# Patient Record
Sex: MALE | Race: WHITE
[De-identification: names, ages, dates, MRNs, and addresses within clinical notes are randomized per-mention and may not be internally consistent; named-entity substitution may affect disease eponyms.]

---

## 2018-03-16 ENCOUNTER — HOSPITAL ENCOUNTER (EMERGENCY)
Dept: HOSPITAL 39 - ER | Age: 73
Discharge: HOME | End: 2018-03-16
Payer: MEDICARE

## 2018-03-16 VITALS — SYSTOLIC BLOOD PRESSURE: 176 MMHG | DIASTOLIC BLOOD PRESSURE: 93 MMHG

## 2018-03-16 VITALS — TEMPERATURE: 99.1 F

## 2018-03-16 VITALS — OXYGEN SATURATION: 97 %

## 2018-03-16 DIAGNOSIS — M70.51: Primary | ICD-10-CM

## 2018-03-16 DIAGNOSIS — F80.9: ICD-10-CM

## 2018-03-16 DIAGNOSIS — I10: ICD-10-CM
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
## 2018-03-16 DIAGNOSIS — L57.8: ICD-10-CM
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
## 2018-03-16 NOTE — ED.PDOC
History of Present Illness





- General


Chief Complaint: Lower Extremity Injury


Stated Complaint: RIGHT KNEE PAIN


Time Seen by Provider: 03/16/18 14:23


Source: patient


Exam Limitations: physical impairment





- History of Present Illness


Initial Comments: 





Angelo Rodriges 73 y/o male came to er with right knee juanis since yesterday .Thru 

 he had previous injury on his right knee a year ago 

when he tripped and fell landing on his knee.No pain on weight bearing .When he 

was connected to the monitor had Bp-237/114 and not taking any medication for 

his elevated BP and no regular md visit.


Occurred: yesterday


Pain - Lower Extremity: mild: Right Knee


Method of Injury: other - chronic knee pain


Improving Factors: rest


Worsening Factors: movement


Associated Symptoms: none


Allergies/Adverse Reactions: 


Allergies





NO KNOWN ALLERGY Allergy (Verified 03/16/18 14:41)


 








Home Medications: 


Ambulatory Orders





Lisinopril 20 mg PO BEDTIME #30 tab 03/16/18 


Tramadol HCl 50 mg PO BEDTIME #20 tab 03/16/18 


amLODIPine BESYLATE [Norvasc] 10 mg PO ACBK #60 tab 03/16/18 











Review of Systems





- Review of Systems


Musculoskeletal: States: see HPI, joint pain - right knee


Unable to Obtain Due To: condition, other - limited sign language of 





Past Medical History (General)





- Patient Medical History


Hx Stroke: No


Hx Dementia: No


Hx Cardiac Disorders: No


Hx Congestive Heart Failure: No


Hx Cancer: Yes - skin


Surgical History: no surgical history





- Social History


Hx Tobacco Use: No


Hx Chewing Tobacco Use: No


Hx Physical Abuse: No


Hx Emotional Abuse: No





- Activities of Daily Living


Patient Lives Alone: Yes


Grooming Ability: Independent


Eating (Feeding) Ability: Independent


Toileting Ability: Independent





Family Medical History





- Family History


  ** Father


Family History: Unknown





  ** Mother


Family History: No Known





Physical Exam





- Physical Exam


General Appearance: Alert, Comfortable, No apparent distress


Eyes, Ears, Nose, Throat: normal ENT inspection


Neck: non-tender, supple


Cardiovascular/Respiratory: regular rate, rhythm, no M/R/G, normal peripheral 

pulses


Gastrointestinal/Abdominal: non-tender, no organomegaly


Back: no CVA tenderness, no vertebral tenderness


Thigh/Hip: non-tender


Leg: non-tender


Knee: pain - along tibial line medially right


Ankle: non-tender


Foot: non-tender


Neuro/Tendon: normal sensation, normal motor functions, no evidence tendon 

injury, other - ambulatory


Mental Status: alert, oriented x 3


Skin: rash - right side face arms





Progress





- Progress


Progress: 





03/16/18 14:41


 Vital Signs - 24 hr











  03/16/18





  14:12


 


Temperature 99.1 F


 


Pulse Rate [ 74





left brachial] 


 


Respiratory 20





Rate 


 


Blood Pressure 234/119





[left brachial] 


 


O2 Sat by Pulse 95





Oximetry 











03/16/18 17:07


BP-176/93





- Results/Orders


Results/Orders: 





 Laboratory Results - last 24 hr











  03/16/18 03/16/18 03/16/18





  14:58 14:58 14:58


 


WBC  4.2 L  


 


RBC  4.49 L  


 


Hgb  13.6 L  


 


Hct  39.8 L  


 


MCV  88.5  


 


MCH  30.2  


 


MCHC  34.3  


 


RDW  13.9  


 


Plt Count  268  


 


MPV  7.9  


 


Absolute Neuts (auto)  2.70  


 


Absolute Lymphs (auto)  1.00  


 


Absolute Monos (auto)  0.40  


 


Absolute Eos (auto)  0.20  


 


Absolute Basos (auto)  0.00  


 


Neutrophils %  63.3  


 


Lymphocytes %  23.3  


 


Monocytes %  8.5  


 


Eosinophils %  3.7  


 


Basophils %  1.2  


 


Sodium   136 


 


Potassium   3.4 L 


 


Chloride   100 L 


 


Carbon Dioxide   28 


 


Anion Gap   11.4 L 


 


BUN   11 


 


Creatinine   0.88 


 


BUN/Creatinine Ratio   12.5 


 


Random Glucose   103 


 


Serum Osmolality   271.6 L 


 


Uric Acid    3.7


 


Calcium   9.3 


 


Total Bilirubin   1.1 H 


 


AST   15 


 


ALT   10 


 


Alkaline Phosphatase   88 


 


Serum Total Protein   7.8 


 


Albumin   4.1 


 


Globulin   3.7 H 


 


Albumin/Globulin Ratio   1.1 














Departure





- Departure


Clinical Impression: 


 Right medial knee pain, Developmental non-verbal disorder, Actinic dermatitis





Bursitis of right knee


Qualifiers:


 Knee bursitis location: suprapatellar bursitis Qualified Code(s): M70.51 - 

Other bursitis of knee, right knee





Hypertension


Qualifiers:


 Hypertension type: unspecified Qualified Code(s): I10 - Essential (primary) 

hypertension





Time of Disposition: 17:12


Disposition: Discharge to Home or Self Care


Condition: Fair


Departure Forms:  ED Discharge - Pt. Copy, Patient Portal Self Enrollment


Prescriptions: 


amLODIPine BESYLATE [Norvasc] 10 mg PO ACBK #60 tab


Lisinopril 20 mg PO BEDTIME #30 tab


Tramadol HCl 50 mg PO BEDTIME #20 tab


Home Medications: 


Ambulatory Orders





Lisinopril 20 mg PO BEDTIME #30 tab 03/16/18 


Tramadol HCl 50 mg PO BEDTIME #20 tab 03/16/18 


amLODIPine BESYLATE [Norvasc] 10 mg PO ACBK #60 tab 03/16/18 








Additional Instructions: 


NEED TO SIGN UP WITH PRIMARY MD PATRICK [phone]

## 2018-03-16 NOTE — RAD
EXAM DESCRIPTION: 



Right knee, 3 radiographs



CLINICAL HISTORY: 



Right knee pain 



FINDINGS/ IMPRESSION: 



Osteopenia. No fracture or focal osteochondral lesion. No lytic

or blastic bony lesion



Moderate suprapatellar joint effusion. No advanced arthrosis.

Small patellofemoral osteophytes. Small enthesophyte at the

quadriceps tendon attachment



Electronically signed by:  Philip Rollins MD  3/16/2018 3:54 PM

CDT Workstation: [id number]